# Patient Record
Sex: FEMALE | Race: BLACK OR AFRICAN AMERICAN | NOT HISPANIC OR LATINO | URBAN - METROPOLITAN AREA
[De-identification: names, ages, dates, MRNs, and addresses within clinical notes are randomized per-mention and may not be internally consistent; named-entity substitution may affect disease eponyms.]

---

## 2021-05-07 ENCOUNTER — TELEPHONE (OUTPATIENT)
Dept: GASTROENTEROLOGY | Facility: CLINIC | Age: 51
End: 2021-05-07

## 2025-02-10 ENCOUNTER — HOSPITAL ENCOUNTER (EMERGENCY)
Facility: HOSPITAL | Age: 55
Discharge: HOME | End: 2025-02-10
Attending: EMERGENCY MEDICINE | Admitting: EMERGENCY MEDICINE

## 2025-02-10 ENCOUNTER — APPOINTMENT (EMERGENCY)
Dept: CT IMAGING | Facility: HOSPITAL | Age: 55
End: 2025-02-10

## 2025-02-10 ENCOUNTER — APPOINTMENT (EMERGENCY)
Dept: RADIOLOGY | Facility: HOSPITAL | Age: 55
End: 2025-02-10

## 2025-02-10 VITALS
DIASTOLIC BLOOD PRESSURE: 91 MMHG | TEMPERATURE: 99.1 F | HEART RATE: 108 BPM | RESPIRATION RATE: 16 BRPM | SYSTOLIC BLOOD PRESSURE: 155 MMHG | OXYGEN SATURATION: 97 %

## 2025-02-10 DIAGNOSIS — R42 LIGHTHEADEDNESS: ICD-10-CM

## 2025-02-10 DIAGNOSIS — R07.9 CHEST PAIN, UNSPECIFIED: Primary | ICD-10-CM

## 2025-02-10 LAB
2HR DELTA HS TROPONIN: 0 NG/L
ALBUMIN SERPL BCG-MCNC: 3.8 G/DL (ref 3.5–5)
ALP SERPL-CCNC: 102 U/L (ref 34–104)
ALT SERPL W P-5'-P-CCNC: 23 U/L (ref 7–52)
ANION GAP SERPL CALCULATED.3IONS-SCNC: 10 MMOL/L (ref 4–13)
AST SERPL W P-5'-P-CCNC: 24 U/L (ref 13–39)
BASOPHILS # BLD AUTO: 0.04 THOUSANDS/ΜL (ref 0–0.1)
BASOPHILS NFR BLD AUTO: 0 % (ref 0–1)
BILIRUB SERPL-MCNC: 0.42 MG/DL (ref 0.2–1)
BUN SERPL-MCNC: 12 MG/DL (ref 5–25)
CALCIUM SERPL-MCNC: 9 MG/DL (ref 8.4–10.2)
CARDIAC TROPONIN I PNL SERPL HS: 3 NG/L (ref ?–50)
CARDIAC TROPONIN I PNL SERPL HS: 3 NG/L (ref ?–50)
CHLORIDE SERPL-SCNC: 104 MMOL/L (ref 96–108)
CO2 SERPL-SCNC: 22 MMOL/L (ref 21–32)
CREAT SERPL-MCNC: 0.82 MG/DL (ref 0.6–1.3)
D DIMER PPP FEU-MCNC: 1.03 UG/ML FEU
EOSINOPHIL # BLD AUTO: 0.01 THOUSAND/ΜL (ref 0–0.61)
EOSINOPHIL NFR BLD AUTO: 0 % (ref 0–6)
ERYTHROCYTE [DISTWIDTH] IN BLOOD BY AUTOMATED COUNT: 17.9 % (ref 11.6–15.1)
FLUAV AG UPPER RESP QL IA.RAPID: NEGATIVE
FLUBV AG UPPER RESP QL IA.RAPID: NEGATIVE
GFR SERPL CREATININE-BSD FRML MDRD: 81 ML/MIN/1.73SQ M
GLUCOSE SERPL-MCNC: 114 MG/DL (ref 65–140)
HCG SERPL QL: NEGATIVE
HCT VFR BLD AUTO: 45.8 % (ref 34.8–46.1)
HGB BLD-MCNC: 14.6 G/DL (ref 11.5–15.4)
IMM GRANULOCYTES # BLD AUTO: 0.07 THOUSAND/UL (ref 0–0.2)
IMM GRANULOCYTES NFR BLD AUTO: 0 % (ref 0–2)
LYMPHOCYTES # BLD AUTO: 1.15 THOUSANDS/ΜL (ref 0.6–4.47)
LYMPHOCYTES NFR BLD AUTO: 7 % (ref 14–44)
MCH RBC QN AUTO: 23 PG (ref 26.8–34.3)
MCHC RBC AUTO-ENTMCNC: 31.9 G/DL (ref 31.4–37.4)
MCV RBC AUTO: 72 FL (ref 82–98)
MONOCYTES # BLD AUTO: 0.63 THOUSAND/ΜL (ref 0.17–1.22)
MONOCYTES NFR BLD AUTO: 4 % (ref 4–12)
NEUTROPHILS # BLD AUTO: 15.24 THOUSANDS/ΜL (ref 1.85–7.62)
NEUTS SEG NFR BLD AUTO: 89 % (ref 43–75)
NRBC BLD AUTO-RTO: 0 /100 WBCS
PLATELET # BLD AUTO: 318 THOUSANDS/UL (ref 149–390)
PMV BLD AUTO: 10.6 FL (ref 8.9–12.7)
POTASSIUM SERPL-SCNC: 3.3 MMOL/L (ref 3.5–5.3)
PROT SERPL-MCNC: 7.4 G/DL (ref 6.4–8.4)
RBC # BLD AUTO: 6.36 MILLION/UL (ref 3.81–5.12)
SARS-COV+SARS-COV-2 AG RESP QL IA.RAPID: NEGATIVE
SODIUM SERPL-SCNC: 136 MMOL/L (ref 135–147)
WBC # BLD AUTO: 17.14 THOUSAND/UL (ref 4.31–10.16)

## 2025-02-10 PROCEDURE — 87811 SARS-COV-2 COVID19 W/OPTIC: CPT | Performed by: EMERGENCY MEDICINE

## 2025-02-10 PROCEDURE — 96374 THER/PROPH/DIAG INJ IV PUSH: CPT

## 2025-02-10 PROCEDURE — 87804 INFLUENZA ASSAY W/OPTIC: CPT | Performed by: EMERGENCY MEDICINE

## 2025-02-10 PROCEDURE — 84703 CHORIONIC GONADOTROPIN ASSAY: CPT | Performed by: EMERGENCY MEDICINE

## 2025-02-10 PROCEDURE — 80053 COMPREHEN METABOLIC PANEL: CPT | Performed by: EMERGENCY MEDICINE

## 2025-02-10 PROCEDURE — 99285 EMERGENCY DEPT VISIT HI MDM: CPT

## 2025-02-10 PROCEDURE — 84484 ASSAY OF TROPONIN QUANT: CPT | Performed by: EMERGENCY MEDICINE

## 2025-02-10 PROCEDURE — 85025 COMPLETE CBC W/AUTO DIFF WBC: CPT | Performed by: EMERGENCY MEDICINE

## 2025-02-10 PROCEDURE — 71275 CT ANGIOGRAPHY CHEST: CPT

## 2025-02-10 PROCEDURE — 36415 COLL VENOUS BLD VENIPUNCTURE: CPT | Performed by: EMERGENCY MEDICINE

## 2025-02-10 PROCEDURE — 71045 X-RAY EXAM CHEST 1 VIEW: CPT

## 2025-02-10 PROCEDURE — 93005 ELECTROCARDIOGRAM TRACING: CPT

## 2025-02-10 PROCEDURE — 96375 TX/PRO/DX INJ NEW DRUG ADDON: CPT

## 2025-02-10 PROCEDURE — 85379 FIBRIN DEGRADATION QUANT: CPT | Performed by: EMERGENCY MEDICINE

## 2025-02-10 PROCEDURE — 99285 EMERGENCY DEPT VISIT HI MDM: CPT | Performed by: EMERGENCY MEDICINE

## 2025-02-10 RX ORDER — KETOROLAC TROMETHAMINE 30 MG/ML
15 INJECTION, SOLUTION INTRAMUSCULAR; INTRAVENOUS ONCE
Status: COMPLETED | OUTPATIENT
Start: 2025-02-10 | End: 2025-02-10

## 2025-02-10 RX ORDER — ACETAMINOPHEN 325 MG/1
975 TABLET ORAL ONCE
Status: COMPLETED | OUTPATIENT
Start: 2025-02-10 | End: 2025-02-10

## 2025-02-10 RX ORDER — ONDANSETRON 2 MG/ML
4 INJECTION INTRAMUSCULAR; INTRAVENOUS ONCE
Status: COMPLETED | OUTPATIENT
Start: 2025-02-10 | End: 2025-02-10

## 2025-02-10 RX ORDER — POTASSIUM CHLORIDE 1500 MG/1
40 TABLET, EXTENDED RELEASE ORAL ONCE
Status: COMPLETED | OUTPATIENT
Start: 2025-02-10 | End: 2025-02-10

## 2025-02-10 RX ORDER — SODIUM CHLORIDE 9 MG/ML
3 INJECTION INTRAVENOUS
Status: DISCONTINUED | OUTPATIENT
Start: 2025-02-10 | End: 2025-02-10 | Stop reason: HOSPADM

## 2025-02-10 RX ADMIN — POTASSIUM CHLORIDE 40 MEQ: 1500 TABLET, EXTENDED RELEASE ORAL at 13:07

## 2025-02-10 RX ADMIN — ONDANSETRON 4 MG: 2 INJECTION INTRAMUSCULAR; INTRAVENOUS at 12:18

## 2025-02-10 RX ADMIN — ACETAMINOPHEN 975 MG: 325 TABLET, FILM COATED ORAL at 15:22

## 2025-02-10 RX ADMIN — IOHEXOL 85 ML: 350 INJECTION, SOLUTION INTRAVENOUS at 13:23

## 2025-02-10 RX ADMIN — KETOROLAC TROMETHAMINE 15 MG: 30 INJECTION, SOLUTION INTRAMUSCULAR; INTRAVENOUS at 12:21

## 2025-02-10 NOTE — ED PROVIDER NOTES
Time reflects when diagnosis was documented in both MDM as applicable and the Disposition within this note       Time User Action Codes Description Comment    2/10/2025  3:19 PM Bonnie Sadler [R07.9] Chest pain, unspecified     2/10/2025  3:20 PM Bonnie Sadler [R42] Lightheadedness           ED Disposition       ED Disposition   Discharge    Condition   Stable    Date/Time   Mon Feb 10, 2025  3:19 PM    Comment   Marjorie Sainteloilouis discharge to home/self care.                   Assessment & Plan       Medical Decision Making  55-year-old female presents for evaluation with chest pain, shortness of breath, and lightheadedness which have been ongoing since last night.  On exam, patient initially hypertensive, mildly tachycardic, and tachypneic.  She appears uncomfortable and is writhing around in bed moaning.  She does have mild epigastric abdominal tenderness.  Rest of exam unremarkable. Differential diagnosis includes, but is not limited to, ACS, pneumothorax, pneumonia, PE, musculoskeletal chest pain, viral URI, or other acute cardiopulmonary abnormalities.  Patient evaluated with cardiac workup including D-dimer and viral swab.    Patient's EKG shows a sinus tachycardia, no acute ischemic changes.  Troponin negative.  Patient's D-dimer was elevated, CTA ordered for further evaluation.  CTA negative for PE or other acute cardiopulmonary abnormalities.  Viral swab negative at this time.  Patient given Tylenol, Toradol, and Zofran for symptomatic treatment.  Patient noted to be mildly hypokalemic, oral potassium given for replacement.  Patient has a heart score 4, will have her follow-up with cardiology for further evaluation.  Patient discharged home in stable condition with symptomatic care instructions and strict ED return precautions.    Amount and/or Complexity of Data Reviewed  Labs: ordered. Decision-making details documented in ED Course.  Radiology: ordered.    Risk  OTC drugs.  Prescription drug  management.        ED Course as of 02/14/25 2345   Mon Feb 10, 2025   1113 Procedure Note: EKG  Date/Time: 02/10/25 11:13 AM   Interpreted by: Bonnie Sadler D.O.  Indications / Diagnosis: CP  ECG reviewed by me, the ED Provider: yes   The EKG demonstrates:  Rhythm: sinus tachycardia  Intervals: normal intervals  Axis: normal axis  QRS/Blocks: normal QRS  ST Changes: No acute ST Changes, no STD/PAUL.   1145 WBC(!): 17.14   1239 D-Dimer, Quant(!): 1.03  Will obtain CTA   1257 Potassium(!): 3.3  Will replete       Medications   ondansetron (ZOFRAN) injection 4 mg (4 mg Intravenous Given 2/10/25 1218)   ketorolac (TORADOL) injection 15 mg (15 mg Intravenous Given 2/10/25 1221)   potassium chloride (Klor-Con M20) CR tablet 40 mEq (40 mEq Oral Given 2/10/25 1307)   iohexol (OMNIPAQUE) 350 MG/ML injection (MULTI-DOSE) 85 mL (85 mL Intravenous Given 2/10/25 1323)   acetaminophen (TYLENOL) tablet 975 mg (975 mg Oral Given 2/10/25 1522)       ED Risk Strat Scores   HEART Risk Score      Flowsheet Row Most Recent Value   Heart Score Risk Calculator    History 1 Filed at: 02/10/2025 1518   ECG 1 Filed at: 02/10/2025 1518   Age 1 Filed at: 02/10/2025 1518   Risk Factors 1 Filed at: 02/10/2025 1518   Troponin 0 Filed at: 02/10/2025 1518   HEART Score 4 Filed at: 02/10/2025 1518          HEART Risk Score      Flowsheet Row Most Recent Value   Heart Score Risk Calculator    History 1 Filed at: 02/10/2025 1518   ECG 1 Filed at: 02/10/2025 1518   Age 1 Filed at: 02/10/2025 1518   Risk Factors 1 Filed at: 02/10/2025 1518   Troponin 0 Filed at: 02/10/2025 1518   HEART Score 4 Filed at: 02/10/2025 1518                            SBIRT 20yo+      Flowsheet Row Most Recent Value   Initial Alcohol Screen: US AUDIT-C     1. How often do you have a drink containing alcohol? 0 Filed at: 02/10/2025 5559   2. How many drinks containing alcohol do you have on a typical day you are drinking?  0 Filed at: 02/10/2025 1059   3b. FEMALE Any Age, or MALE  65+: How often do you have 4 or more drinks on one occassion? 0 Filed at: 02/10/2025 1057   Audit-C Score 0 Filed at: 02/10/2025 1057   ERICH: How many times in the past year have you...    Used an illegal drug or used a prescription medication for non-medical reasons? Never Filed at: 02/10/2025 1057                            History of Present Illness       Chief Complaint   Patient presents with    Chest Pain     Chest pain that started around 2100 last night, patient also having dizziness and vomiting        Past Medical History:   Diagnosis Date    H/O gastric sleeve       History reviewed. No pertinent surgical history.   History reviewed. No pertinent family history.   Social History     Tobacco Use    Smoking status: Never    Smokeless tobacco: Never   Vaping Use    Vaping status: Never Used   Substance Use Topics    Alcohol use: Never    Drug use: Never      E-Cigarette/Vaping    E-Cigarette Use Never User       E-Cigarette/Vaping Substances      I have reviewed and agree with the history as documented.     55-year-old female with a past medical history of rheumatoid arthritis, iron deficiency anemia, and previous gastric bypass surgery presents for evaluation with chest pain.  Patient states that symptoms began last night.  She denies the pain as a pressure-like sensation in the center of her chest.  She denies any radiation of the pain into her back.  She endorses associated shortness of breath and lightheadedness, states she feels as though she is going to pass out.  Patient also endorsing nausea and vomiting.  She denies any abdominal pain or other concerning symptoms.        Review of Systems   Constitutional:  Negative for fever.   Respiratory:  Positive for shortness of breath.    Cardiovascular:  Positive for chest pain.   Gastrointestinal:  Positive for nausea and vomiting. Negative for abdominal pain.   Musculoskeletal:  Negative for back pain.   Neurological:  Positive for light-headedness.   All  other systems reviewed and are negative.          Objective       ED Triage Vitals   Temperature Pulse Blood Pressure Respirations SpO2 Patient Position - Orthostatic VS   02/10/25 1056 02/10/25 1056 02/10/25 1056 02/10/25 1056 02/10/25 1056 02/10/25 1056   99.1 °F (37.3 °C) 105 (!) 170/103 (!) 24 100 % Sitting      Temp Source Heart Rate Source BP Location FiO2 (%) Pain Score    02/10/25 1056 02/10/25 1056 02/10/25 1056 -- 02/10/25 1221    Temporal Monitor Left arm  9      Vitals      Date and Time Temp Pulse SpO2 Resp BP Pain Score FACES Pain Rating User   02/10/25 1430 -- 108 97 % 16 155/91 -- -- DO   02/10/25 1330 -- 93 100 % 17 155/82 -- -- SB   02/10/25 1300 -- 90 99 % -- 128/66 -- -- DO   02/10/25 1230 -- 96 97 % 25 147/84 -- -- SB   02/10/25 1221 -- -- -- -- -- 9 -- DO   02/10/25 1145 -- 99 99 % 20 132/66 -- -- SB   02/10/25 1056 99.1 °F (37.3 °C) 105 100 % 24 170/103 -- -- CT            Physical Exam  Vitals and nursing note reviewed.   Constitutional:       General: She is awake.      Appearance: She is not toxic-appearing.      Comments: Patient appears uncomfortable, is writhing around in bed and moaning.   HENT:      Head: Normocephalic and atraumatic.   Eyes:      General: Vision grossly intact. Gaze aligned appropriately.   Cardiovascular:      Rate and Rhythm: Regular rhythm. Tachycardia present.      Heart sounds: Normal heart sounds.   Pulmonary:      Effort: Pulmonary effort is normal. Tachypnea present. No respiratory distress.      Breath sounds: Normal breath sounds.   Abdominal:      General: There is no distension.      Palpations: Abdomen is soft.      Comments: Mild epigastric abdominal tenderness.   Musculoskeletal:      Cervical back: Full passive range of motion without pain and neck supple.   Skin:     General: Skin is warm and dry.   Neurological:      General: No focal deficit present.      Mental Status: She is alert and oriented to person, place, and time.         Results Reviewed        Procedure Component Value Units Date/Time    HS Troponin I 2hr [[id number]]  (Normal) Collected: 02/10/25 1358    Lab Status: Final result Specimen: Blood from Arm, Left Updated: 02/10/25 1436     hs TnI 2hr 3 ng/L      Delta 2hr hsTnI 0 ng/L     Comprehensive metabolic panel [[id number]]  (Abnormal) Collected: 02/10/25 1228    Lab Status: Final result Specimen: Blood from Arm, Right Updated: 02/10/25 1257     Sodium 136 mmol/L      Potassium 3.3 mmol/L      Chloride 104 mmol/L      CO2 22 mmol/L      ANION GAP 10 mmol/L      BUN 12 mg/dL      Creatinine 0.82 mg/dL      Glucose 114 mg/dL      Calcium 9.0 mg/dL      AST 24 U/L      ALT 23 U/L      Alkaline Phosphatase 102 U/L      Total Protein 7.4 g/dL      Albumin 3.8 g/dL      Total Bilirubin 0.42 mg/dL      eGFR 81 ml/min/1.73sq m     Narrative:      [employer] guidelines for Chronic Kidney Disease (CKD):     Stage 1 with normal or high GFR (GFR > 90 mL/min/1.73 square meters)    Stage 2 Mild CKD (GFR = 60-89 mL/min/1.73 square meters)    Stage 3A Moderate CKD (GFR = 45-59 mL/min/1.73 square meters)    Stage 3B Moderate CKD (GFR = 30-44 mL/min/1.73 square meters)    Stage 4 Severe CKD (GFR = 15-29 mL/min/1.73 square meters)    Stage 5 End Stage CKD (GFR <15 mL/min/1.73 square meters)  Note: GFR calculation is accurate only with a steady state creatinine    D-dimer, quantitative [[id number]]  (Abnormal) Collected: 02/10/25 1207    Lab Status: Final result Specimen: Blood from Arm, Right Updated: 02/10/25 1235     D-Dimer, Quant 1.03 ug/ml FEU     Narrative:      In the evaluation for possible pulmonary embolism, in the appropriate (Well's Score of 4 or less) patient, the age adjusted d-dimer cutoff for this patient can be calculated as:    Age x 0.01 (in ug/mL) for Age-adjusted D-dimer exclusion threshold for a patient over 50 years.    COVID-19/ Infleunza A/B Rapid Anitgen(30 min. TAT) [[id number]]  (Normal) Collected: 02/10/25 5623     Lab Status: Final result Specimen: Nares from Nose Updated: 02/10/25 1234     SARS COV Rapid Antigen Negative     Influenza A Rapid Antigen Negative     Influenza B Rapid Antigen Negative    Narrative:      This test has been performed using the [employer] Grisel 2 FLU+SARS Antigen test under the Emergency Use Authorization (EUA). This test has been validated by the  and verified by the performing laboratory. The Grisel uses lateral flow immunofluorescent sandwich assay to detect SARS-COV, Influenza A and Influenza B Antigen.     The [employer] Grisel 2 SARS Antigen test does not differentiate between SARS-CoV and SARS-CoV-2.     Negative results are presumptive and may be confirmed with a molecular assay, if necessary, for patient management. Negative results do not rule out SARS-CoV-2 or influenza infection and should not be used as the sole basis for treatment or patient management decisions. A negative test result may occur if the level of antigen in a sample is below the limit of detection of this test.     Positive results are indicative of the presence of viral antigens, but do not rule out bacterial infection or co-infection with other viruses.     All test results should be used as an adjunct to clinical observations and other information available to the provider.    FOR PEDIATRIC PATIENTS - copy/paste COVID Guidelines URL to browser: [email or web address]    hCG, qualitative pregnancy [[id number]]  (Normal) Collected: 02/10/25 1134    Lab Status: Final result Specimen: Blood from Arm, Right Updated: 02/10/25 1209     Preg, Serum Negative    HS Troponin 0hr (reflex protocol) [[id number]]  (Normal) Collected: 02/10/25 1134    Lab Status: Final result Specimen: Blood from Arm, Right Updated: 02/10/25 1207     hs TnI 0hr 3 ng/L     CBC and differential [[id number]]  (Abnormal) Collected: 02/10/25 1134    Lab Status: Final result Specimen: Blood from Arm,  Right Updated: 02/10/25 1142     WBC 17.14 Thousand/uL      RBC 6.36 Million/uL      Hemoglobin 14.6 g/dL      Hematocrit 45.8 %      MCV 72 fL      MCH 23.0 pg      MCHC 31.9 g/dL      RDW 17.9 %      MPV 10.6 fL      Platelets 318 Thousands/uL      nRBC 0 /100 WBCs      Segmented % 89 %      Immature Grans % 0 %      Lymphocytes % 7 %      Monocytes % 4 %      Eosinophils Relative 0 %      Basophils Relative 0 %      Absolute Neutrophils 15.24 Thousands/µL      Absolute Immature Grans 0.07 Thousand/uL      Absolute Lymphocytes 1.15 Thousands/µL      Absolute Monocytes 0.63 Thousand/µL      Eosinophils Absolute 0.01 Thousand/µL      Basophils Absolute 0.04 Thousands/µL             CTA chest pe study   Final Interpretation by Maxi Coronado MD (02/10 1514)      No evidence of pulmonary embolus.      No acute process in the chest.               Resident: Quique Askew I, the attending radiologist, have reviewed the images and agree with the final report above.      Workstation performed: [id number]         X-ray chest 1 view portable   Final Interpretation by Henny Davis MD (02/10 1846)      No acute cardiopulmonary disease.            Workstation performed: [id number]             Procedures    ED Medication and Procedure Management   None     There are no discharge medications for this patient.    No discharge procedures on file.  ED SEPSIS DOCUMENTATION   Time reflects when diagnosis was documented in both MDM as applicable and the Disposition within this note       Time User Action Codes Description Comment    2/10/2025  3:19 PM Bonnie Sadler [R07.9] Chest pain, unspecified     2/10/2025  3:20 PM Bonnie Sadler Add [R42] Lightheadedness                  Bonnie Sadler DO  02/14/25 1797

## 2025-02-11 LAB
ATRIAL RATE: 97 BPM
P AXIS: 62 DEGREES
PR INTERVAL: 138 MS
QRS AXIS: 57 DEGREES
QRSD INTERVAL: 72 MS
QT INTERVAL: 302 MS
QTC INTERVAL: 383 MS
T WAVE AXIS: 13 DEGREES
VENTRICULAR RATE: 97 BPM

## 2025-02-11 PROCEDURE — 93010 ELECTROCARDIOGRAM REPORT: CPT | Performed by: INTERNAL MEDICINE
